# Patient Record
Sex: MALE | Race: WHITE | NOT HISPANIC OR LATINO | Employment: FULL TIME | URBAN - METROPOLITAN AREA
[De-identification: names, ages, dates, MRNs, and addresses within clinical notes are randomized per-mention and may not be internally consistent; named-entity substitution may affect disease eponyms.]

---

## 2020-06-28 ENCOUNTER — HOSPITAL ENCOUNTER (EMERGENCY)
Facility: HOSPITAL | Age: 25
Discharge: HOME | End: 2020-06-28
Attending: EMERGENCY MEDICINE
Payer: MEDICAID

## 2020-06-28 VITALS
HEART RATE: 77 BPM | BODY MASS INDEX: 39.17 KG/M2 | RESPIRATION RATE: 18 BRPM | WEIGHT: 315 LBS | DIASTOLIC BLOOD PRESSURE: 57 MMHG | OXYGEN SATURATION: 100 % | SYSTOLIC BLOOD PRESSURE: 123 MMHG | TEMPERATURE: 98 F | HEIGHT: 75 IN

## 2020-06-28 DIAGNOSIS — Z20.822 SUSPECTED COVID-19 VIRUS INFECTION: Primary | ICD-10-CM

## 2020-06-28 LAB — SARS-COV-2 RDRP RESP QL NAA+PROBE: NEGATIVE

## 2020-06-28 PROCEDURE — 99284 EMERGENCY DEPT VISIT MOD MDM: CPT

## 2020-06-28 PROCEDURE — U0002 COVID-19 LAB TEST NON-CDC: HCPCS

## 2020-06-28 RX ORDER — IBUPROFEN 600 MG/1
600 TABLET ORAL EVERY 6 HOURS PRN
Qty: 20 TABLET | Refills: 0 | Status: SHIPPED | OUTPATIENT
Start: 2020-06-28

## 2020-06-28 RX ORDER — BENZONATATE 100 MG/1
100 CAPSULE ORAL 3 TIMES DAILY PRN
Qty: 20 CAPSULE | Refills: 0 | Status: SHIPPED | OUTPATIENT
Start: 2020-06-28 | End: 2020-07-08

## 2020-06-28 NOTE — DISCHARGE INSTRUCTIONS
Your COVID-19 test was negative, however you have a close sick contacts and have COVID symptoms.   Stay home and stay away from family members and friends. The CDC says, you can leave home after these three things have happened: 1) You have had no fever for at least 72 hours (that is three full days of no fever without the use of medicine that reduces fevers) 2) AND other symptoms have improved (for example, when your cough or shortness of breath have improved) 3) AND at least 10 days have passed since your symptoms first appeared.  Separate yourself from other people and animals in your home.  Call ahead before visiting your doctor.  Wear a facemask.  Cover your coughs and sneezes.  Wash your hands often with soap and water; hand  can be used, too.  Avoid sharing personal household items.  Wipe down surfaces used daily.  Monitor your symptoms. Seek prompt medical attention if your illness is worsening (e.g., difficulty breathing).   Before seeking care, call your healthcare provider.  If you have a medical emergency and need to call 911, notify the dispatch personnel that you have, or are being evaluated for COVID-19. If possible, put on a facemask before emergency medical services arrive.    Take all medications as prescribed.

## 2020-06-28 NOTE — ED TRIAGE NOTES
Pt arrived to the ED due to cough, SOB, body aches, chills, and congestion that started last night. Pt reports that his wife tested positive today

## 2020-06-28 NOTE — Clinical Note
"[facility] "Lana Nicole was seen and treated in our emergency department on 6/28/2020.     COVID-19 is present in our communities across the state. There is limited testing for COVID at this time, so not all patients can be tested. In this situation, your employee meets the following criteria:    Bonnie Nicole has met the criteria for COVID-19 testing and has a NEGATIVE result. The employee can return to work once they are asymptomatic for 72 hours without the use of fever reducing medications (Tylenol, Motrin, etc).     If you have any questions or concerns, or if I can be of further assistance, please do not hesitate to contact me.    Sincerely,             Harika Keenan NP"

## 2020-06-29 NOTE — ED PROVIDER NOTES
Encounter Date: 6/28/2020       History     Chief Complaint   Patient presents with    COVID-19 Concerns     body aches, chills, cough, congestion that started yesterday. pt has not been tested for COVID-19 yet; pt took tylenol about 45 minutes ago      CC:  COVID-19 concerns    HPI:  This is a 24-year-old male with no medical problems presenting for evaluation of 2 day history of generalized body aches, cough, congestion.  He reports his wife tested positive for COVID-19 today.  He has been taking Tylenol with improvement in symptoms.  He does not smoke or vape.  No other alcohol or drug use.    Of note, nursing staff as documented patient's weight is 235 kg.  Patient is 235 lb.    The history is provided by the patient. No  was used.     Review of patient's allergies indicates:  No Known Allergies  No past medical history on file.  No past surgical history on file.  No family history on file.  Social History     Tobacco Use    Smoking status: Not on file   Substance Use Topics    Alcohol use: Not on file    Drug use: Not on file     Review of Systems   Constitutional: Negative for chills and fever.   HENT: Positive for congestion. Negative for sore throat.    Respiratory: Positive for cough. Negative for shortness of breath.    Cardiovascular: Negative for chest pain.   Gastrointestinal: Negative for nausea.   Genitourinary: Negative for dysuria.   Musculoskeletal: Positive for myalgias. Negative for back pain.   Skin: Negative for rash.   Neurological: Negative for weakness.   Hematological: Does not bruise/bleed easily.       Physical Exam     Initial Vitals [06/28/20 1445]   BP Pulse Resp Temp SpO2   138/63 98 18 98.2 °F (36.8 °C) 98 %      MAP       --         Physical Exam    Vitals reviewed.  Constitutional: He appears well-developed and well-nourished. He is not diaphoretic.  Non-toxic appearance. He does not have a sickly appearance. He does not appear ill. No distress.   HENT:    Head: Normocephalic and atraumatic.   Right Ear: External ear normal.   Left Ear: External ear normal.   Nose: Nose normal.   Mouth/Throat: Oropharynx is clear and moist. No oropharyngeal exudate.   Eyes: Conjunctivae and EOM are normal. Pupils are equal, round, and reactive to light. Right eye exhibits no discharge. Left eye exhibits no discharge.   Neck: Normal range of motion.   Cardiovascular: Normal rate, regular rhythm, normal heart sounds and intact distal pulses.   Pulmonary/Chest: No respiratory distress.   Speaking in full and clear sentences without dyspnea or pause.  No tachypnea.   Musculoskeletal: Normal range of motion.   Neurological: He is alert and oriented to person, place, and time. GCS eye subscore is 4. GCS verbal subscore is 5. GCS motor subscore is 6.   Skin: Skin is warm, dry and intact. No rash noted. No erythema.   Psychiatric: He has a normal mood and affect. Thought content normal.         ED Course   Procedures  Labs Reviewed   SARS-COV-2 RNA AMPLIFICATION, QUAL          Imaging Results    None          Medical Decision Making:   ED Management:  This is an evaluation of a 24 y.o. male that presents to the Emergency Department for URI symptoms for 1 day. The patient is a non-toxic, afebrile, and well appearing male. On physical exam ears and pharynx are without evidence of infection. Appears well hydrated with moist mucus membranes. Neck soft and supple with no meningeal signs or cervical lymphadenopathy. Breath sounds are clear and equal bilaterally with no adventitious breath sounds, tachypnea or respiratory distress with room air pulse ox of 100% and no evidence of hypoxia.     Vital Signs Are Reassuring.  RESULTS:   COVID negative.  However, patient has close positive sick contact and is symptomatic.  Discussed high possibility of infection.  Recommend home quarantine.    I considered, but at this time, do not suspect OM, OE, strep pharyngitis, meningitis, pneumonia, or acute  bacterial sinusitis.    The diagnosis, treatment plan, instructions for follow-up and reevaluation with PCP as well as ED return precautions were discussed and understanding was verbalized. All questions or concerns have been addressed.                                  Clinical Impression:       ICD-10-CM ICD-9-CM   1. Suspected Covid-19 Virus Infection  R68.89          Disposition:   Disposition: Discharged  Condition: Stable     ED Disposition Condition    Discharge Stable        ED Prescriptions     Medication Sig Dispense Start Date End Date Auth. Provider    benzonatate (TESSALON) 100 MG capsule Take 1 capsule (100 mg total) by mouth 3 (three) times daily as needed for Cough. 20 capsule 6/28/2020 7/8/2020 Harika Keenan NP    ibuprofen (ADVIL,MOTRIN) 600 MG tablet Take 1 tablet (600 mg total) by mouth every 6 (six) hours as needed for Pain. 20 tablet 6/28/2020  Harika Keenan NP        Follow-up Information     Follow up With Specialties Details Why Contact Info    [facility]   For follow-up if you do not have a primary care doctor [address] [place] [address]  [phone]      [facility] Go to  If symptoms worsen [address]  [facility] [id number] [phone]                                     Harika Keenan NP  06/30/20 0620

## 2024-02-21 ENCOUNTER — OFFICE VISIT (OUTPATIENT)
Dept: FAMILY MEDICINE | Facility: HOSPITAL | Age: 29
End: 2024-02-21
Payer: MEDICAID

## 2024-02-21 ENCOUNTER — LAB VISIT (OUTPATIENT)
Dept: LAB | Facility: HOSPITAL | Age: 29
End: 2024-02-21
Payer: MEDICAID

## 2024-02-21 VITALS
HEART RATE: 97 BPM | WEIGHT: 218.69 LBS | HEIGHT: 75 IN | OXYGEN SATURATION: 99 % | BODY MASS INDEX: 27.19 KG/M2 | SYSTOLIC BLOOD PRESSURE: 134 MMHG | DIASTOLIC BLOOD PRESSURE: 74 MMHG

## 2024-02-21 DIAGNOSIS — K21.9 GASTROESOPHAGEAL REFLUX DISEASE WITHOUT ESOPHAGITIS: ICD-10-CM

## 2024-02-21 DIAGNOSIS — R05.3 CHRONIC COUGH: ICD-10-CM

## 2024-02-21 DIAGNOSIS — E66.3 OVERWEIGHT (BMI 25.0-29.9): ICD-10-CM

## 2024-02-21 DIAGNOSIS — Z00.00 ENCOUNTER FOR MEDICAL EXAMINATION TO ESTABLISH CARE: Primary | ICD-10-CM

## 2024-02-21 LAB
ALBUMIN SERPL BCP-MCNC: 4.5 G/DL (ref 3.5–5.2)
ALP SERPL-CCNC: 111 U/L (ref 55–135)
ALT SERPL W/O P-5'-P-CCNC: 88 U/L (ref 10–44)
ANION GAP SERPL CALC-SCNC: 10 MMOL/L (ref 8–16)
AST SERPL-CCNC: 31 U/L (ref 10–40)
BASOPHILS # BLD AUTO: 0.06 K/UL (ref 0–0.2)
BASOPHILS NFR BLD: 0.6 % (ref 0–1.9)
BILIRUB SERPL-MCNC: 0.6 MG/DL (ref 0.1–1)
BUN SERPL-MCNC: 12 MG/DL (ref 6–20)
CALCIUM SERPL-MCNC: 10 MG/DL (ref 8.7–10.5)
CHLORIDE SERPL-SCNC: 100 MMOL/L (ref 95–110)
CHOLEST SERPL-MCNC: 161 MG/DL (ref 120–199)
CHOLEST/HDLC SERPL: 4.9 {RATIO} (ref 2–5)
CO2 SERPL-SCNC: 28 MMOL/L (ref 23–29)
CREAT SERPL-MCNC: 1 MG/DL (ref 0.5–1.4)
DIFFERENTIAL METHOD BLD: NORMAL
EOSINOPHIL # BLD AUTO: 0.1 K/UL (ref 0–0.5)
EOSINOPHIL NFR BLD: 0.8 % (ref 0–8)
ERYTHROCYTE [DISTWIDTH] IN BLOOD BY AUTOMATED COUNT: 12.1 % (ref 11.5–14.5)
EST. GFR  (NO RACE VARIABLE): >60 ML/MIN/1.73 M^2
ESTIMATED AVG GLUCOSE: 100 MG/DL (ref 68–131)
GLUCOSE SERPL-MCNC: 91 MG/DL (ref 70–110)
HBA1C MFR BLD: 5.1 % (ref 4–5.6)
HCT VFR BLD AUTO: 48.8 % (ref 40–54)
HDLC SERPL-MCNC: 33 MG/DL (ref 40–75)
HDLC SERPL: 20.5 % (ref 20–50)
HGB BLD-MCNC: 16.2 G/DL (ref 14–18)
IMM GRANULOCYTES # BLD AUTO: 0.02 K/UL (ref 0–0.04)
IMM GRANULOCYTES NFR BLD AUTO: 0.2 % (ref 0–0.5)
LDLC SERPL CALC-MCNC: 113 MG/DL (ref 63–159)
LYMPHOCYTES # BLD AUTO: 1.7 K/UL (ref 1–4.8)
LYMPHOCYTES NFR BLD: 18.7 % (ref 18–48)
MCH RBC QN AUTO: 27.7 PG (ref 27–31)
MCHC RBC AUTO-ENTMCNC: 33.2 G/DL (ref 32–36)
MCV RBC AUTO: 83 FL (ref 82–98)
MONOCYTES # BLD AUTO: 0.7 K/UL (ref 0.3–1)
MONOCYTES NFR BLD: 7.5 % (ref 4–15)
NEUTROPHILS # BLD AUTO: 6.7 K/UL (ref 1.8–7.7)
NEUTROPHILS NFR BLD: 72.2 % (ref 38–73)
NONHDLC SERPL-MCNC: 128 MG/DL
NRBC BLD-RTO: 0 /100 WBC
PLATELET # BLD AUTO: 376 K/UL (ref 150–450)
PMV BLD AUTO: 9.9 FL (ref 9.2–12.9)
POTASSIUM SERPL-SCNC: 4.3 MMOL/L (ref 3.5–5.1)
PROT SERPL-MCNC: 8.4 G/DL (ref 6–8.4)
RBC # BLD AUTO: 5.85 M/UL (ref 4.6–6.2)
SODIUM SERPL-SCNC: 138 MMOL/L (ref 136–145)
TRIGL SERPL-MCNC: 75 MG/DL (ref 30–150)
WBC # BLD AUTO: 9.31 K/UL (ref 3.9–12.7)

## 2024-02-21 PROCEDURE — 80053 COMPREHEN METABOLIC PANEL: CPT

## 2024-02-21 PROCEDURE — 36415 COLL VENOUS BLD VENIPUNCTURE: CPT

## 2024-02-21 PROCEDURE — 85025 COMPLETE CBC W/AUTO DIFF WBC: CPT

## 2024-02-21 PROCEDURE — 99203 OFFICE O/P NEW LOW 30 MIN: CPT

## 2024-02-21 PROCEDURE — 80061 LIPID PANEL: CPT

## 2024-02-21 PROCEDURE — 83036 HEMOGLOBIN GLYCOSYLATED A1C: CPT

## 2024-02-21 RX ORDER — FAMOTIDINE 20 MG/1
20 TABLET, FILM COATED ORAL DAILY
Qty: 60 TABLET | Refills: 2 | Status: SHIPPED | OUTPATIENT
Start: 2024-02-21 | End: 2024-08-19

## 2024-02-21 RX ORDER — CETIRIZINE HYDROCHLORIDE 10 MG/1
10 TABLET ORAL DAILY
Qty: 42 TABLET | Refills: 1 | Status: SHIPPED | OUTPATIENT
Start: 2024-02-21 | End: 2024-05-15

## 2024-02-21 RX ORDER — FLUTICASONE PROPIONATE 50 MCG
1 SPRAY, SUSPENSION (ML) NASAL DAILY
Qty: 15.8 ML | Refills: 2 | Status: SHIPPED | OUTPATIENT
Start: 2024-02-21

## 2024-02-21 NOTE — PROGRESS NOTES
[facility]  History & Physical    SUBJECTIVE:     Chief Complaint:   No chief complaint on file.      History of Present Illness:  28 y.o. male presents to clinic today for establishing care and address a chronic cough and congestion. Patient's cough has been going on for 6-7 months without any known inciting event. Patient reports that he coughs every day, often productive for clear sputum. Patient has identified certain triggers for his cough including spicy foods or cold air. He denies any associated nausea, but does endorse occasional vomiting associated with coughing fits. He also endorses symptoms of reflux after certain foods, including fried foods. Patient has not tried anything over the counter to address his cough. He does admit that his cough has improved with recent weight loss. He does take a vitamin supplement every day as well as sea prabhakar gel. Patient says he received all of his childhood vaccines, but declined any additional vaccines today.    Active Problem List with Overview Notes    Diagnosis Date Noted    Chronic cough 02/21/2024          Immunizations:  Immunizations discussed with patient. Advised to bring in immunizations records to ensure patient is UTD. Patient voiced understanding    Allergies:  Review of patient's allergies indicates:  No Known Allergies    Home Medications:  Current Outpatient Medications on File Prior to Visit   Medication Sig    ibuprofen (ADVIL,MOTRIN) 600 MG tablet Take 1 tablet (600 mg total) by mouth every 6 (six) hours as needed for Pain.     No current facility-administered medications on file prior to visit.       No past medical history on file.  No past surgical history on file.  No family history on file.  Social History     Tobacco Use    Smoking status: Never    Smokeless tobacco: Never        Review of Systems   Constitutional:  Negative for chills, fever, malaise/fatigue and weight loss.   HENT:  Positive for congestion and sore throat. Negative  for ear pain, hearing loss and sinus pain.    Eyes:  Negative for discharge and redness.   Respiratory:  Positive for cough and sputum production. Negative for shortness of breath and wheezing.    Gastrointestinal:  Positive for heartburn and vomiting. Negative for abdominal pain, blood in stool, constipation, diarrhea, melena and nausea.   Endo/Heme/Allergies:  Negative for environmental allergies.        OBJECTIVE:     Vital Signs:  Pulse: 97 (02/21/24 0907)  BP: 134/74 (02/21/24 0907)  SpO2: 99 % (02/21/24 0907)    Physical Exam  Vitals and nursing note reviewed.   Constitutional:       Appearance: Normal appearance.   HENT:      Head: Normocephalic and atraumatic.      Right Ear: Tympanic membrane normal.      Left Ear: Tympanic membrane normal.      Nose: No congestion or rhinorrhea.      Mouth/Throat:      Pharynx: No oropharyngeal exudate or posterior oropharyngeal erythema.   Eyes:      Extraocular Movements: Extraocular movements intact.      Conjunctiva/sclera: Conjunctivae normal.   Cardiovascular:      Rate and Rhythm: Normal rate and regular rhythm.   Pulmonary:      Effort: Pulmonary effort is normal.      Breath sounds: Normal breath sounds.   Neurological:      Mental Status: He is alert and oriented to person, place, and time.   Psychiatric:         Mood and Affect: Mood normal.         Behavior: Behavior normal.         A/P:  Diagnoses and all orders for this visit:    Encounter for medical examination to establish care    Overweight (BMI 25.0-29.9)  -     fluticasone propionate (FLONASE) 50 mcg/actuation nasal spray; 1 spray (50 mcg total) by Each Nostril route once daily.  -     Lipid Panel; Future  -     Hemoglobin A1C; Future  -     CBC W/ AUTO DIFFERENTIAL; Future  -     COMPREHENSIVE METABOLIC PANEL; Future    Chronic cough  - Allergies/PND vs GERD. Will treat both. Discussed nasal rinses and nose spray technique   -     cetirizine (ZYRTEC) 10 MG tablet; Take 1 tablet (10 mg total) by mouth  once daily.  -     fluticasone propionate (FLONASE) 50 mcg/actuation nasal spray; 1 spray (50 mcg total) by Each Nostril route once daily.    Gastroesophageal reflux disease without esophagitis  - possible etiology of chronic cough given onset with weight gain, worsening reflux symptoms and improvement with weight loss. can increase to BID if reflux symptoms persistent despite daily famotidine  -     famotidine (PEPCID) 20 MG tablet; Take 1 tablet (20 mg total) by mouth once daily.      Follow up in 6 weeks after trial of above treatment to evaluate chronic cough         Neymar Lo DO  \[facility]\"" Family Medicine, PGY-2

## 2024-02-23 NOTE — PROGRESS NOTES
I assume primary medical responsibility for this patient. I have reviewed the history, physical, and assessement & treatment plan with the resident and agree that the care is reasonable and necessary. This service has been performed by a resident without the presence of a teaching physician under the primary care exception. If necessary, an addendum of additional findings or evaluation beyond the resident documentation will be noted below.    Agree with conservative treatment of chronic cough and what is likely a benign etiology. No suspected respiratory etiology/symptoms necessitating imaging.